# Patient Record
Sex: MALE | Race: OTHER | Employment: FULL TIME | URBAN - METROPOLITAN AREA
[De-identification: names, ages, dates, MRNs, and addresses within clinical notes are randomized per-mention and may not be internally consistent; named-entity substitution may affect disease eponyms.]

---

## 2017-02-10 ENCOUNTER — HOSPITAL ENCOUNTER (EMERGENCY)
Facility: HOSPITAL | Age: 72
Discharge: HOME | End: 2017-02-10
Attending: EMERGENCY MEDICINE
Payer: COMMERCIAL

## 2017-02-10 ENCOUNTER — APPOINTMENT (OUTPATIENT)
Dept: GENERAL RADIOLOGY | Facility: HOSPITAL | Age: 72
End: 2017-02-10
Payer: COMMERCIAL

## 2017-02-10 ENCOUNTER — APPOINTMENT (OUTPATIENT)
Dept: CT IMAGING | Facility: HOSPITAL | Age: 72
End: 2017-02-10
Attending: EMERGENCY MEDICINE
Payer: COMMERCIAL

## 2017-02-10 ENCOUNTER — HOSPITAL ENCOUNTER (EMERGENCY)
Facility: HOSPITAL | Age: 72
Discharge: STILL ADMITTED OR ADMINISTRATIVE | End: 2017-02-13
Payer: COMMERCIAL

## 2017-02-10 VITALS
RESPIRATION RATE: 18 BRPM | HEIGHT: 68 IN | SYSTOLIC BLOOD PRESSURE: 155 MMHG | TEMPERATURE: 99 F | HEART RATE: 52 BPM | WEIGHT: 196 LBS | OXYGEN SATURATION: 100 % | BODY MASS INDEX: 29.7 KG/M2 | DIASTOLIC BLOOD PRESSURE: 81 MMHG

## 2017-02-10 DIAGNOSIS — K59.00 CONSTIPATION, UNSPECIFIED CONSTIPATION TYPE: Primary | ICD-10-CM

## 2017-02-10 LAB
ALBUMIN SERPL-MCNC: 3.5 G/DL (ref 3.5–4.8)
ALP LIVER SERPL-CCNC: 67 U/L (ref 45–117)
ALT SERPL-CCNC: 31 U/L (ref 17–63)
AST SERPL-CCNC: 17 U/L (ref 15–41)
BASOPHILS # BLD AUTO: 0.02 X10(3) UL (ref 0–0.1)
BASOPHILS NFR BLD AUTO: 0.3 %
BILIRUB SERPL-MCNC: 0.6 MG/DL (ref 0.1–2)
BUN BLD-MCNC: 21 MG/DL (ref 8–20)
CALCIUM BLD-MCNC: 9.6 MG/DL (ref 8.3–10.3)
CHLORIDE: 102 MMOL/L (ref 101–111)
CO2: 29 MMOL/L (ref 22–32)
CREAT BLD-MCNC: 0.86 MG/DL (ref 0.7–1.3)
EOSINOPHIL # BLD AUTO: 0.16 X10(3) UL (ref 0–0.3)
EOSINOPHIL NFR BLD AUTO: 2.8 %
ERYTHROCYTE [DISTWIDTH] IN BLOOD BY AUTOMATED COUNT: 13.1 % (ref 11.5–16)
GLUCOSE BLD-MCNC: 85 MG/DL (ref 70–99)
HCT VFR BLD AUTO: 40 % (ref 37–53)
HGB BLD-MCNC: 13.1 G/DL (ref 13–17)
IMMATURE GRANULOCYTE COUNT: 0.01 X10(3) UL (ref 0–1)
IMMATURE GRANULOCYTE RATIO %: 0.2 %
LYMPHOCYTES # BLD AUTO: 1.55 X10(3) UL (ref 0.9–4)
LYMPHOCYTES NFR BLD AUTO: 26.8 %
M PROTEIN MFR SERPL ELPH: 8.2 G/DL (ref 6.1–8.3)
MCH RBC QN AUTO: 29.6 PG (ref 27–33.2)
MCHC RBC AUTO-ENTMCNC: 32.8 G/DL (ref 31–37)
MCV RBC AUTO: 90.3 FL (ref 80–99)
MONOCYTES # BLD AUTO: 0.62 X10(3) UL (ref 0.1–0.6)
MONOCYTES NFR BLD AUTO: 10.7 %
NEUTROPHIL ABS PRELIM: 3.43 X10 (3) UL (ref 1.3–6.7)
NEUTROPHILS # BLD AUTO: 3.43 X10(3) UL (ref 1.3–6.7)
NEUTROPHILS NFR BLD AUTO: 59.2 %
PLATELET # BLD AUTO: 279 10(3)UL (ref 150–450)
POTASSIUM SERPL-SCNC: 3.8 MMOL/L (ref 3.6–5.1)
RBC # BLD AUTO: 4.43 X10(6)UL (ref 3.8–5.8)
RED CELL DISTRIBUTION WIDTH-SD: 42.7 FL (ref 35.1–46.3)
SODIUM SERPL-SCNC: 137 MMOL/L (ref 136–144)
WBC # BLD AUTO: 5.8 X10(3) UL (ref 4–13)

## 2017-02-10 PROCEDURE — 96360 HYDRATION IV INFUSION INIT: CPT

## 2017-02-10 PROCEDURE — 99284 EMERGENCY DEPT VISIT MOD MDM: CPT

## 2017-02-10 PROCEDURE — 85025 COMPLETE CBC W/AUTO DIFF WBC: CPT | Performed by: EMERGENCY MEDICINE

## 2017-02-10 PROCEDURE — 99285 EMERGENCY DEPT VISIT HI MDM: CPT

## 2017-02-10 PROCEDURE — 80053 COMPREHEN METABOLIC PANEL: CPT | Performed by: EMERGENCY MEDICINE

## 2017-02-10 PROCEDURE — 74000 XR ABDOMEN (1 VIEW) (CPT=74000): CPT

## 2017-02-10 PROCEDURE — 74177 CT ABD & PELVIS W/CONTRAST: CPT

## 2017-02-10 PROCEDURE — 96361 HYDRATE IV INFUSION ADD-ON: CPT

## 2017-02-10 NOTE — ED INITIAL ASSESSMENT (HPI)
US on weds - showed constipation. Stool softener not helping. Last normal BM 2-3 weeks ago. Pt had small BM this am. Pt feels distended and pt had been told he had a little bit of ileus.

## 2017-02-10 NOTE — ED NOTES
Apologized to patient and wife for long wait time for test procedure, CT. Patient in stable condition.

## 2017-02-11 NOTE — ED PROVIDER NOTES
Patient Seen in: [facility] Emergency Department    History   Patient presents with:  Constipation (gastrointestinal)    Stated Complaint: abdominal pain    HPI    Patient is a 47-year-old male comes in emergency room for evaluation of constipation. (GOLYTELY) 236 g Oral Recon Soln,  Take 4,000 mL by mouth once. Pantoprazole Sodium 40 MG Oral Tab EC,  Take 1 tablet (40 mg total) by mouth 2 (two) times daily before meals.  Before meal   Metoclopramide HCl 5 MG Oral Tab,  Take 1 tablet (5 mg total) by HEENT: Normocephalic, atraumatic. Moist mucous membranes. Pupils equal round reactive to light accommodation, extraocular motion is intact, sclerae white, conjunctiva is pink. Oropharynx is unremarkable, no exudate.   NECK: Supple, trachea midline, no abdominal pain. His last normal bowel movement was three weeks ago. FINDINGS:  BOWEL GAS PATTERN:  Mildly distended air-filled loops of colon throughout the abdomen are noted. Moderate amount of stool in the ascending colon is noted.  CALCIFICATIONS:  N bowel distention or bowel wall thickening. Normal appendix. ABDOMINAL WALL:  Stable fat containing right inguinal hernia. URINARY BLADDER:  Normal.  No visible focal wall thickening, lesion, or calculus. PELVIC NODES:  Normal.  No adenopathy.   PELVIC ORGA encounter diagnosis)    Disposition:  Discharge    Follow-up:  Antonette Wells MD  [address] [facility] [address] [place] [address] [phone]      As needed      Medications Prescribed:  Discharge Medication List as of 2/10/2017  7:57 PM    STAR

## 2017-02-25 ENCOUNTER — APPOINTMENT (OUTPATIENT)
Dept: LAB | Facility: HOSPITAL | Age: 72
End: 2017-02-25
Payer: COMMERCIAL

## 2017-02-25 DIAGNOSIS — R10.13 EPIGASTRIC PAIN: ICD-10-CM

## 2017-02-25 LAB
ATRIAL RATE: 55 BPM
P AXIS: 68 DEGREES
P-R INTERVAL: 174 MS
Q-T INTERVAL: 430 MS
QRS DURATION: 90 MS
QTC CALCULATION (BEZET): 411 MS
R AXIS: -38 DEGREES
T AXIS: -6 DEGREES
VENTRICULAR RATE: 55 BPM

## 2017-02-25 PROCEDURE — 93005 ELECTROCARDIOGRAM TRACING: CPT

## 2017-02-25 PROCEDURE — 93010 ELECTROCARDIOGRAM REPORT: CPT | Performed by: INTERNAL MEDICINE

## 2017-03-06 ENCOUNTER — SURGERY (OUTPATIENT)
Age: 72
End: 2017-03-06

## 2017-03-06 ENCOUNTER — ANESTHESIA (OUTPATIENT)
Dept: ENDOSCOPY | Facility: HOSPITAL | Age: 72
End: 2017-03-06
Payer: COMMERCIAL

## 2017-03-06 ENCOUNTER — ANESTHESIA EVENT (OUTPATIENT)
Dept: ENDOSCOPY | Facility: HOSPITAL | Age: 72
End: 2017-03-06
Payer: COMMERCIAL

## 2017-03-06 ENCOUNTER — HOSPITAL ENCOUNTER (OUTPATIENT)
Facility: HOSPITAL | Age: 72
Setting detail: HOSPITAL OUTPATIENT SURGERY
Discharge: HOME | End: 2017-03-06
Attending: INTERNAL MEDICINE | Admitting: INTERNAL MEDICINE
Payer: COMMERCIAL

## 2017-03-06 VITALS
DIASTOLIC BLOOD PRESSURE: 70 MMHG | WEIGHT: 197 LBS | SYSTOLIC BLOOD PRESSURE: 118 MMHG | HEIGHT: 68 IN | RESPIRATION RATE: 16 BRPM | HEART RATE: 47 BPM | OXYGEN SATURATION: 98 % | TEMPERATURE: 98 F | BODY MASS INDEX: 29.86 KG/M2

## 2017-03-06 DIAGNOSIS — R11.0 NAUSEA: ICD-10-CM

## 2017-03-06 DIAGNOSIS — R10.13 EPIGASTRIC PAIN: Primary | ICD-10-CM

## 2017-03-06 DIAGNOSIS — Z80.0 FH: GASTRIC CANCER: ICD-10-CM

## 2017-03-06 PROCEDURE — 88305 TISSUE EXAM BY PATHOLOGIST: CPT | Performed by: INTERNAL MEDICINE

## 2017-03-06 PROCEDURE — 0DB68ZX EXCISION OF STOMACH, VIA NATURAL OR ARTIFICIAL OPENING ENDOSCOPIC, DIAGNOSTIC: ICD-10-PCS | Performed by: INTERNAL MEDICINE

## 2017-03-06 RX ORDER — SODIUM CHLORIDE, SODIUM LACTATE, POTASSIUM CHLORIDE, CALCIUM CHLORIDE 600; 310; 30; 20 MG/100ML; MG/100ML; MG/100ML; MG/100ML
INJECTION, SOLUTION INTRAVENOUS CONTINUOUS
Status: DISCONTINUED | OUTPATIENT
Start: 2017-03-06 | End: 2017-03-06

## 2017-03-06 RX ORDER — NALOXONE HYDROCHLORIDE 0.4 MG/ML
80 INJECTION, SOLUTION INTRAMUSCULAR; INTRAVENOUS; SUBCUTANEOUS AS NEEDED
Status: DISCONTINUED | OUTPATIENT
Start: 2017-03-06 | End: 2017-03-06

## 2017-03-06 RX ORDER — ONDANSETRON 2 MG/ML
4 INJECTION INTRAMUSCULAR; INTRAVENOUS AS NEEDED
Status: DISCONTINUED | OUTPATIENT
Start: 2017-03-06 | End: 2017-03-06

## 2017-03-06 NOTE — ANESTHESIA PREPROCEDURE EVALUATION
PRE-OP EVALUATION    Patient Name: Elizabeth Bautista    Pre-op Diagnosis: Nausea [R11.0]  Epigastric pain [R10.13]  FH: gastric cancer [Z80.0]    Procedure(s):  ESOPHAGOGASTRODUODENOSCOPY     Surgeon(s) and Role:     Mary Jo Rain MD - Primary    Pre- Procedure: COLONOSCOPY;  Surgeon: Juan Scott MD;  Location: [address] [place] [address] ENDOSCOPY    CATARACT  2011    Comment B  IOLI    REMOVAL OF HYDROCELE,TUNICA,BILAT  6/20/16    Comment L>R, hydrocelectomy, scrotal exploration, EDW    REMOVAL OF HYDROCELE,TUNICA,UNILAT Admission:   **None**

## 2017-03-06 NOTE — OPERATIVE REPORT
OPERATIVE REPORT   PATIENT NAME: Marlena Lin  MRN: [id number]  DATE OF OPERATION: 3/6/2017  PREOPERATIVE DIAGNOSIS:   1. Nausea, abdominal pain  2. Sister with gastric cancer  POSTOPERATIVE DIAGNOSES:  1.  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 2. Stomach with no evidence of ulcers, or masses. Snake skin apparance of body of the stomach and punctate erythema, biopsies taken to r/o HJonna Francis.  Retroflex

## 2017-03-06 NOTE — ANESTHESIA POSTPROCEDURE EVALUATION
[address] [facility] Patient Status:  Hospital Outpatient Surgery   Age/Gender 73year old male MRN [id number]   [facility] ENDOSCOPY Attending Margarita Holstein, MD   Hosp Day # 0 PCP Von Lange MD       Anesthesia Post-op N

## 2017-03-09 NOTE — PROGRESS NOTES
Quick Note:    3/9/2017  [facility]  [address] [place] [id number]    Dear Leticia Orellana,       Here are the biopsy/pathology findings from your recent EGD (Upper Endoscopy):  1. Stomach biopsies POSITIVE for H Pylori bacteria.

## 2017-05-26 PROCEDURE — 87338 HPYLORI STOOL AG IA: CPT | Performed by: NURSE PRACTITIONER

## 2017-05-26 PROCEDURE — 81003 URINALYSIS AUTO W/O SCOPE: CPT | Performed by: FAMILY MEDICINE

## 2017-09-30 ENCOUNTER — HOSPITAL ENCOUNTER (EMERGENCY)
Facility: HOSPITAL | Age: 72
Discharge: HOME | End: 2017-09-30
Attending: EMERGENCY MEDICINE
Payer: COMMERCIAL

## 2017-09-30 ENCOUNTER — APPOINTMENT (OUTPATIENT)
Dept: GENERAL RADIOLOGY | Facility: HOSPITAL | Age: 72
End: 2017-09-30
Attending: EMERGENCY MEDICINE
Payer: COMMERCIAL

## 2017-09-30 VITALS
SYSTOLIC BLOOD PRESSURE: 144 MMHG | RESPIRATION RATE: 20 BRPM | OXYGEN SATURATION: 94 % | HEART RATE: 66 BPM | DIASTOLIC BLOOD PRESSURE: 78 MMHG | TEMPERATURE: 98 F | WEIGHT: 194 LBS | HEIGHT: 68 IN | BODY MASS INDEX: 29.4 KG/M2

## 2017-09-30 DIAGNOSIS — J21.9 ACUTE BRONCHIOLITIS DUE TO UNSPECIFIED ORGANISM: ICD-10-CM

## 2017-09-30 DIAGNOSIS — J12.9 VIRAL PNEUMONIA: Primary | ICD-10-CM

## 2017-09-30 PROCEDURE — 36415 COLL VENOUS BLD VENIPUNCTURE: CPT

## 2017-09-30 PROCEDURE — 85025 COMPLETE CBC W/AUTO DIFF WBC: CPT | Performed by: EMERGENCY MEDICINE

## 2017-09-30 PROCEDURE — 94640 AIRWAY INHALATION TREATMENT: CPT

## 2017-09-30 PROCEDURE — 99284 EMERGENCY DEPT VISIT MOD MDM: CPT

## 2017-09-30 PROCEDURE — 71020 XR CHEST PA + LAT CHEST (CPT=71020): CPT | Performed by: EMERGENCY MEDICINE

## 2017-09-30 PROCEDURE — 80053 COMPREHEN METABOLIC PANEL: CPT | Performed by: EMERGENCY MEDICINE

## 2017-09-30 RX ORDER — AZITHROMYCIN 250 MG/1
TABLET, FILM COATED ORAL
Qty: 1 PACKAGE | Refills: 0 | Status: SHIPPED | OUTPATIENT
Start: 2017-09-30 | End: 2017-10-05

## 2017-09-30 RX ORDER — PREDNISONE 20 MG/1
40 TABLET ORAL DAILY
Qty: 10 TABLET | Refills: 0 | Status: SHIPPED | OUTPATIENT
Start: 2017-09-30 | End: 2017-10-05

## 2017-09-30 RX ORDER — ALBUTEROL SULFATE 90 UG/1
2 AEROSOL, METERED RESPIRATORY (INHALATION) EVERY 4 HOURS PRN
Qty: 1 INHALER | Refills: 0 | Status: SHIPPED | OUTPATIENT
Start: 2017-09-30 | End: 2017-10-30

## 2017-09-30 RX ORDER — IPRATROPIUM BROMIDE AND ALBUTEROL SULFATE 2.5; .5 MG/3ML; MG/3ML
3 SOLUTION RESPIRATORY (INHALATION) ONCE
Status: COMPLETED | OUTPATIENT
Start: 2017-09-30 | End: 2017-09-30

## 2017-09-30 RX ORDER — PREDNISONE 20 MG/1
60 TABLET ORAL ONCE
Status: COMPLETED | OUTPATIENT
Start: 2017-09-30 | End: 2017-09-30

## 2017-09-30 NOTE — ED INITIAL ASSESSMENT (HPI)
Complaint of cough and SOB that started today around noon after eating lunch. Denies fevers. Denies chest pain. Patient said his grandson, whom lives with him and his wife, was sick with similar symptoms three days ago.

## 2017-10-01 NOTE — ED PROVIDER NOTES
Patient Seen in: [facility] Emergency Department    History   Patient presents with:  Cough/URI  Dyspnea RETA SOB (respiratory)    Stated Complaint: cough/ RETA    HPI    17-year-old male who comes to the emergency department with 2 days of cough.   He Mother      BREAST   • Diabetes Mother    • Breast Cancer Mother    • Cancer Sister      \"STOMACH\"   • Lipids Sister    • Diabetes Sister    • Cancer Other      NONE       Smoking status: Former Smoker Notable for the following:     Neutrophil Absolute Prelim 7.91 (*)     Neutrophil Absolute 7.91 (*)     Lymphocyte Absolute 0.81 (*)     Monocyte Absolute 0.64 (*)     All other components within normal limits   CBC WITH DIFFERENTIAL WITH PLATELET    Shira Hudson Refills: 0    Albuterol Sulfate  (90 Base) MCG/ACT Inhalation Aero Soln  Inhale 2 puffs into the lungs every 4 (four) hours as needed for Wheezing.   Qty: 1 Inhaler Refills: 0

## (undated) DEVICE — Device: Brand: DEFENDO AIR/WATER/SUCTION AND BIOPSY VALVE

## (undated) DEVICE — FORCEP RADIAL JAW 4

## (undated) NOTE — LETTER
3/9/2017          Hiram Feliz  [address] [facility] [id number]    Dear Luci Don,       Here are the biopsy/pathology findings from your recent EGD (Upper  Endoscopy):  1. Stomach biopsies POSITIVE for H Pylori bacteria.

## (undated) NOTE — ED AVS SNAPSHOT
[facility] Emergency Department    [place] [address]    Phone:  [phone]    Fax:  [phone]           Jjia Trini   MRN: [id number]    Department:  [facility] Emergency Department   Date of Visit:  2/ IF THERE IS ANY CHANGE OR WORSENING OF YOUR CONDITION, CALL YOUR PRIMARY CARE PHYSICIAN AT ONCE OR RETURN IMMEDIATELY TO THE EMERGENCY DEPARTMENT.     If you have been prescribed any medication(s), please fill your prescription right away and begin taking t

## (undated) NOTE — ED AVS SNAPSHOT
Corky Rooney   MRN: [id number]    Department:  [facility] Emergency Department   Date of Visit:  9/30/2017           Disclosure     Insurance plans vary and the physician(s) referred by the ER may not be covered by your plan.  Please contact y If you have been prescribed any medication(s), please fill your prescription right away and begin taking the medication(s) as directed    If the emergency physician has read X-rays, these will be re-interpreted by a radiologist.  If there is a significant

## (undated) NOTE — ED AVS SNAPSHOT
[facility] Emergency Department    [facility]  [address] [address]    Phone:  [phone]    Fax:  [phone]           Beto Anders   MRN: [id number]    Department:  [facility] Emergency Department   Date of Visit:  2/ START taking these medications     PEG 3350-KCl-NaBcb-NaCl-NaSulf 236 g Solr   Quantity:  4000 mL   Commonly known as:  GOLYTELY   Take 4,000 mL by mouth once.             Where to Get Your Medications      You can get these medications from any pharmacy primary care or specialist physician will see patients referred from the [facility] Emergency Department. Follow-up care is at the discretion of that Physician.     IF THERE IS ANY CHANGE OR WORSENING OF YOUR CONDITION, CALL YOUR PRIMARY CARE PHYSICIAN - If you have concerns related to behavioral health issues or thoughts of harming yourself, contact [address] [place] [address] at [phone].     - If you don’t have insurance, Noe Ramos has partnered with [employer] LIVER:  Stable hemangioma in posterior right liver. BILIARY:  Normal.  No visible dilatation or calcification. PANCREAS:  Normal.  No lesion, fluid collection, ductal dilatation, or atrophy. SPLEEN:  Normal.  No enlargement or focal lesion.     [facility] the abdomen are noted. Moderate amount of stool in the ascending colon is noted. CALCIFICATIONS:  None significant. OTHER:  Negative. No abnormal gaseous collections. MyChart     Sign up for [employer], your secure online medical record.   [address] [place]

## (undated) NOTE — LETTER
February 10, 2017    Patient: Keshia Patiño   Date of Visit: 2/10/2017       To Whom It May Concern:    Keshia Patiño was seen and treated in our emergency department on 2/10/2017.  He was in the ER from 1pm to 8pm. Please excuse Mr [facility] fr

## (undated) NOTE — IP AVS SNAPSHOT
[facility] [place] [address] [place], [address] ~ [phone]                Discharge Summary   3/6/2017    Kirti Lieberman           Admission Information        Provider Department    3/6/2017 Venancio Calixto MD  Endoscopy - If you experience any sharp pain in your neck, abdomen or chest, vomiting of blood, oral temperature over 100 degrees Fahrenheit, light-headedness or dizziness, or any other problems, contact your doctor.     **If unable to reach your doctor, please go to (02/10/17)  59.2 (02/10/17)  26.8 (02/10/17)  10.7 (02/10/17)  2.8 (02/10/17)  0.3 -- (02/10/17)  3.43 (02/10/17)  1.55 (02/10/17)  0.62 (H) (02/10/17)  0.16 (02/10/17)  0.02    (01/23/17)  63.7 (01/23/17)  23.9 (01/23/17)  9.7 (01/23/17)  2.0   (01/23/17 Sign up for [employer], your secure online medical record. [employer] will allow you to access patient instructions from your recent visit,  view other health information, and more. To sign up or find more information, go to [email or web address]

## (undated) NOTE — LETTER
September 30, 2017    Patient: Karen Lovett   Date of Visit: 9/30/2017       To Whom It May Concern:    Karen Lovett was seen and treated in our emergency department on 9/30/2017. He should not return to work until 10/3/17.     If you have any